# Patient Record
Sex: FEMALE | Race: WHITE | NOT HISPANIC OR LATINO | URBAN - METROPOLITAN AREA
[De-identification: names, ages, dates, MRNs, and addresses within clinical notes are randomized per-mention and may not be internally consistent; named-entity substitution may affect disease eponyms.]

---

## 2017-06-13 ENCOUNTER — HOSPITAL ENCOUNTER (OUTPATIENT)
Dept: MAMMOGRAPHY | Facility: HOSPITAL | Age: 57
Discharge: HOME | End: 2017-06-13
Attending: INTERNAL MEDICINE

## 2017-06-13 DIAGNOSIS — Z12.31 VISIT FOR SCREENING MAMMOGRAM: ICD-10-CM

## 2017-10-10 ENCOUNTER — RECORDS - HEALTHEAST (OUTPATIENT)
Dept: ADMINISTRATIVE | Facility: OTHER | Age: 57
End: 2017-10-10

## 2017-10-10 LAB
LAB AP CHARGES (HE HISTORICAL CONVERSION): NORMAL
PATH REPORT.COMMENTS IMP SPEC: NORMAL
PATH REPORT.COMMENTS IMP SPEC: NORMAL
PATH REPORT.FINAL DX SPEC: NORMAL
PATH REPORT.GROSS SPEC: NORMAL
PATH REPORT.MICROSCOPIC SPEC OTHER STN: NORMAL
PATH REPORT.RELEVANT HX SPEC: NORMAL
RESULT FLAG (HE HISTORICAL CONVERSION): NORMAL

## 2017-11-28 ENCOUNTER — RECORDS - HEALTHEAST (OUTPATIENT)
Dept: ADMINISTRATIVE | Facility: OTHER | Age: 57
End: 2017-11-28

## 2018-06-26 ENCOUNTER — HOSPITAL ENCOUNTER (OUTPATIENT)
Dept: MAMMOGRAPHY | Facility: CLINIC | Age: 58
Discharge: HOME | End: 2018-06-26
Attending: INTERNAL MEDICINE

## 2018-06-26 DIAGNOSIS — Z12.31 VISIT FOR SCREENING MAMMOGRAM: ICD-10-CM

## 2018-12-06 ENCOUNTER — RECORDS - HEALTHEAST (OUTPATIENT)
Dept: ADMINISTRATIVE | Facility: OTHER | Age: 58
End: 2018-12-06

## 2019-08-08 ENCOUNTER — HOSPITAL ENCOUNTER (OUTPATIENT)
Dept: MAMMOGRAPHY | Facility: CLINIC | Age: 59
Discharge: HOME | End: 2019-08-08
Attending: INTERNAL MEDICINE

## 2019-08-08 DIAGNOSIS — Z12.31 VISIT FOR SCREENING MAMMOGRAM: ICD-10-CM

## 2020-08-10 ENCOUNTER — HOSPITAL ENCOUNTER (OUTPATIENT)
Dept: MAMMOGRAPHY | Facility: CLINIC | Age: 60
Discharge: HOME | End: 2020-08-10
Attending: INTERNAL MEDICINE

## 2020-08-10 DIAGNOSIS — Z12.31 VISIT FOR SCREENING MAMMOGRAM: ICD-10-CM

## 2020-11-06 ENCOUNTER — COMMUNICATION - HEALTHEAST (OUTPATIENT)
Dept: SCHEDULING | Facility: CLINIC | Age: 60
End: 2020-11-06

## 2020-11-19 ENCOUNTER — OFFICE VISIT - HEALTHEAST (OUTPATIENT)
Dept: FAMILY MEDICINE | Facility: CLINIC | Age: 60
End: 2020-11-19

## 2020-11-19 DIAGNOSIS — M19.049 ARTHRITIS OF HAND: ICD-10-CM

## 2020-11-19 RX ORDER — PREDNISOLONE ACETATE 10 MG/ML
SUSPENSION/ DROPS OPHTHALMIC
Status: SHIPPED | COMMUNITY
Start: 2020-11-12 | End: 2023-05-22

## 2020-12-10 ENCOUNTER — RECORDS - HEALTHEAST (OUTPATIENT)
Dept: ADMINISTRATIVE | Facility: OTHER | Age: 60
End: 2020-12-10

## 2020-12-18 ENCOUNTER — HOSPITAL ENCOUNTER (OUTPATIENT)
Dept: ULTRASOUND IMAGING | Facility: CLINIC | Age: 60
Discharge: HOME | End: 2020-12-18
Attending: INTERNAL MEDICINE

## 2020-12-18 ENCOUNTER — HOSPITAL ENCOUNTER (OUTPATIENT)
Dept: MAMMOGRAPHY | Facility: CLINIC | Age: 60
Discharge: HOME | End: 2020-12-18
Attending: INTERNAL MEDICINE

## 2020-12-18 DIAGNOSIS — N64.59 INVERSION OF LEFT NIPPLE: ICD-10-CM

## 2020-12-22 ENCOUNTER — RECORDS - HEALTHEAST (OUTPATIENT)
Dept: ADMINISTRATIVE | Facility: OTHER | Age: 60
End: 2020-12-22

## 2021-01-26 ENCOUNTER — HOSPITAL ENCOUNTER (OUTPATIENT)
Dept: MRI IMAGING | Facility: HOSPITAL | Age: 61
Discharge: HOME | End: 2021-01-26
Attending: INTERNAL MEDICINE

## 2021-01-26 DIAGNOSIS — Z85.3 HISTORY OF MALIGNANT NEOPLASM OF BREAST: ICD-10-CM

## 2021-02-16 ENCOUNTER — APPOINTMENT (RX ONLY)
Dept: URBAN - METROPOLITAN AREA CLINIC 322 | Facility: CLINIC | Age: 61
Setting detail: DERMATOLOGY
End: 2021-02-16

## 2021-02-16 VITALS — TEMPERATURE: 96.3 F

## 2021-02-16 DIAGNOSIS — L57.0 ACTINIC KERATOSIS: ICD-10-CM

## 2021-02-16 DIAGNOSIS — L98.8 OTHER SPECIFIED DISORDERS OF THE SKIN AND SUBCUTANEOUS TISSUE: ICD-10-CM

## 2021-02-16 PROCEDURE — ? TREATMENT REGIMEN

## 2021-02-16 PROCEDURE — 99202 OFFICE O/P NEW SF 15 MIN: CPT | Mod: 25

## 2021-02-16 PROCEDURE — ? FULL BODY SKIN EXAM - DECLINED

## 2021-02-16 PROCEDURE — ? COUNSELING

## 2021-02-16 PROCEDURE — ? LIQUID NITROGEN

## 2021-02-16 PROCEDURE — 17000 DESTRUCT PREMALG LESION: CPT

## 2021-02-16 ASSESSMENT — LOCATION DETAILED DESCRIPTION DERM
LOCATION DETAILED: LEFT CENTRAL BUCCAL CHEEK
LOCATION DETAILED: NASAL TIP

## 2021-02-16 ASSESSMENT — LOCATION ZONE DERM
LOCATION ZONE: NOSE
LOCATION ZONE: FACE

## 2021-02-16 ASSESSMENT — LOCATION SIMPLE DESCRIPTION DERM
LOCATION SIMPLE: LEFT CHEEK
LOCATION SIMPLE: NOSE

## 2021-02-16 NOTE — PROCEDURE: MIPS QUALITY
Quality 226: Preventive Care And Screening: Tobacco Use: Screening And Cessation Intervention: Patient screened for tobacco use and is an ex/non-smoker
Quality 130: Documentation Of Current Medications In The Medical Record: Current Medications Documented
Detail Level: Detailed
Quality 431: Preventive Care And Screening: Unhealthy Alcohol Use - Screening: Patient screened for unhealthy alcohol use using a single question and scores less than 2 times per year
Patient baseline mental status

## 2021-05-27 ENCOUNTER — RECORDS - HEALTHEAST (OUTPATIENT)
Dept: ADMINISTRATIVE | Facility: CLINIC | Age: 61
End: 2021-05-27

## 2021-05-29 ENCOUNTER — RECORDS - HEALTHEAST (OUTPATIENT)
Dept: ADMINISTRATIVE | Facility: CLINIC | Age: 61
End: 2021-05-29

## 2021-05-30 ENCOUNTER — RECORDS - HEALTHEAST (OUTPATIENT)
Dept: ADMINISTRATIVE | Facility: CLINIC | Age: 61
End: 2021-05-30

## 2021-06-02 ENCOUNTER — RECORDS - HEALTHEAST (OUTPATIENT)
Dept: ADMINISTRATIVE | Facility: CLINIC | Age: 61
End: 2021-06-02

## 2021-06-05 ENCOUNTER — RECORDS - HEALTHEAST (OUTPATIENT)
Dept: SCHEDULING | Facility: CLINIC | Age: 61
End: 2021-06-05

## 2021-06-05 VITALS
DIASTOLIC BLOOD PRESSURE: 86 MMHG | HEART RATE: 73 BPM | OXYGEN SATURATION: 98 % | SYSTOLIC BLOOD PRESSURE: 114 MMHG | WEIGHT: 147 LBS

## 2021-06-05 DIAGNOSIS — C50.919 MALIGNANT NEOPLASM OF BREAST (FEMALE) (H): ICD-10-CM

## 2021-06-12 NOTE — TELEPHONE ENCOUNTER
New Appointment Needed  What is the reason for the visit:    Pre-Op Appt Request  When is the surgery? :  12/3/20right eye 12/17/20 left eye cataract  Where is the surgery?:   [facility]  Who is the surgeon? :  Dr. Chavez  What type of surgery is being done?: Cataract  Provider Preference: Patient would prefer Dr. Julia Ragsdale/patient would also just like to have a regular full physical with blood work done a few days prior instead of just pre op/please let patient know route of care for that  How soon do you need to be seen?: After 11/18/20  Waitlist offered?: No  Okay to leave a detailed message:  Yes

## 2021-06-13 NOTE — PROGRESS NOTES
[facility]  [address] [place] [address] [address]VE [facility] [address]  [facility] [address]  Dept: [phone]  Dept Fax: [phone]  Primary Provider: Lakshmi Perez MD  Pre-op Performing Provider: MARÍA JEFFERSON    PREOPERATIVE EVALUATION:  Today's date: 11/19/2020    Dipti Reyes is a 60 y.o. female who presents for a preoperative evaluation.    Surgical Information:  Surgery/Procedure: Cataract   Surgery Location: [facility]   Surgeon: Dr. Chavez  Surgery Date: 12/3/20  Time of Surgery:   Where patient plans to recover: At home with family  Fax number for surgical facility: [phone]    Type of Anesthesia Anticipated: to be determined    Subjective     HPI related to upcoming procedure: Patient has a history of laser vision correction and her distance vision continues to degenerate. She is unable to correct this with just glasses at this point. The blurry vision is impacting her life. If the surgery on one eye goes well, she will do the second.       Health Care Directive:  Patient does not have a Health Care Directive or Living Will: Discussed advance care planning with patient; information given to patient to review.    Preoperative Review of :    reviewed - no record of controlled substances prescribed.    See problem list for active medical problems.  Problems all longstanding and stable, except as noted/documented.  See ROS for pertinent symptoms related to these conditions.      Review of Systems   Endorses tinnitus.   Constitutional, neuro, ENT, endocrine, pulmonary, cardiac, gastrointestinal, genitourinary, musculoskeletal, integument and psychiatric systems are negative, except as otherwise noted.      Patient Active Problem List    Diagnosis Date Noted     Arthritis of hand 11/19/2020     Past Medical History:   Diagnosis Date     Breast cancer (H) 2011    left     Cataract      Hx of radiation therapy      Past Surgical History:   Procedure Laterality Date     BREAST BIOPSY  Right 2012     BREAST BIOPSY Left 2011     BREAST LUMPECTOMY Left 2011     OOPHORECTOMY       MD EXCISE BREAST CYST      Description: Breast Surgery Lumpectomy;  Recorded: 03/08/2013;  Comments: Left, breast CA     MD LIGATE FALLOPIAN TUBE      Description: Tubal Ligation;  Recorded: 04/18/2011;     MD REMOVAL OF TONSILS,<13 Y/O      Description: Tonsillectomy;  Recorded: 04/18/2011;     Current Outpatient Medications   Medication Sig Dispense Refill     prednisoLONE acetate (PRED-FORTE) 1 % ophthalmic suspension INSTILL 1 DROP RIGHT EYE 4 TIMES A DAY FOR ONE WEEK THEN TAPER. START AFTER SURGERY.       No current facility-administered medications for this visit.        No Known Allergies    Social History     Tobacco Use     Smoking status: Never Smoker     Smokeless tobacco: Never Used   Substance Use Topics     Alcohol use: Yes     Alcohol/week: 6.0 standard drinks     Types: 6 Glasses of wine per week      Family History   Problem Relation Age of Onset     Hypertension Mother      Macular degeneration Father      Diabetes Sister      Hypertension Paternal Aunt      Social History     Substance and Sexual Activity   Drug Use Never        Objective     /86   Pulse 73   Wt 147 lb (66.7 kg)   SpO2 98%   Physical Exam  Gen: Alert, NAD, appears stated age, normal hygiene   Eyes: conjunctivae without injection, sclera clear, EOMI  ENT/mouth: nares clear, septum midline, absent rhinorrhea, throat without injection, neck is supple, no thyroid enlargement  CV: RRR, no murmur appreciated, pedal edema absent bilaterally  Resp: CTAB, no wheezes, rales or ronchi  ABD: normoactive, non-tender to palpation, nondistended  MSK: grossly full range of motion in all joints, no obvious deformity  Neuro: CN II-XII grossly intact, no deficits in coordination  Psych: no apparent hallucinations or delusions, no pressured speech; alert, oriented x3  SKIN: dry and without lesions  Heme/lymph: no pallor, no active bleeding/bruising,  no adenopathy appreciated    No results for input(s): HGB, PLT, INR, NA, K, CREATININE, HBA1C in the last 88000 hours.     PRE-OP Diagnostics:   No labs were ordered during this visit.  No EKG required for low risk surgery (cataract, skin procedure, breast biopsy, etc).    REVISED CARDIAC RISK INDEX (RCRI)   The patient has the following serious cardiovascular risks for perioperative complications:   - No serious cardiac risks = 0 points    RCRI INTERPRETATION: 0 points: Class I (very low risk - 0.4% complication rate)       Assessment & Plan      The proposed surgical procedure is considered LOW risk.    Medication Instructions:  Patient is on no chronic medications    RECOMMENDATION:  APPROVAL GIVEN to proceed with proposed procedure, without further diagnostic evaluation.    Signed Electronically by: Julia Ragsdale MD    Copy of this evaluation report is provided to requesting physician.    [facility] Guidelines    Revised Cardiac Risk Index

## 2021-06-16 PROBLEM — M19.049 ARTHRITIS OF HAND: Status: ACTIVE | Noted: 2020-11-19

## 2021-07-13 ENCOUNTER — RECORDS - HEALTHEAST (OUTPATIENT)
Dept: ADMINISTRATIVE | Facility: CLINIC | Age: 61
End: 2021-07-13

## 2021-07-21 ENCOUNTER — RECORDS - HEALTHEAST (OUTPATIENT)
Dept: ADMINISTRATIVE | Facility: CLINIC | Age: 61
End: 2021-07-21

## 2022-07-27 ENCOUNTER — LAB REQUISITION (OUTPATIENT)
Dept: LAB | Facility: CLINIC | Age: 62
End: 2022-07-27
Payer: COMMERCIAL

## 2022-07-27 DIAGNOSIS — Z12.4 ENCOUNTER FOR SCREENING FOR MALIGNANT NEOPLASM OF CERVIX: ICD-10-CM

## 2022-07-27 DIAGNOSIS — Z13.220 ENCOUNTER FOR SCREENING FOR LIPOID DISORDERS: ICD-10-CM

## 2022-07-27 DIAGNOSIS — Z13.1 ENCOUNTER FOR SCREENING FOR DIABETES MELLITUS: ICD-10-CM

## 2022-07-27 DIAGNOSIS — Z13.0 ENCOUNTER FOR SCREENING FOR DISEASES OF THE BLOOD AND BLOOD-FORMING ORGANS AND CERTAIN DISORDERS INVOLVING THE IMMUNE MECHANISM: ICD-10-CM

## 2022-07-27 DIAGNOSIS — R31.9 HEMATURIA, UNSPECIFIED: ICD-10-CM

## 2022-07-27 DIAGNOSIS — Z13.21 ENCOUNTER FOR SCREENING FOR NUTRITIONAL DISORDER: ICD-10-CM

## 2022-07-27 LAB
CHOLEST SERPL-MCNC: 251 MG/DL
FASTING STATUS PATIENT QL REPORTED: YES
GLUCOSE SERPL-MCNC: 100 MG/DL (ref 70–99)
HDLC SERPL-MCNC: 76 MG/DL
HGB BLD-MCNC: 15 G/DL (ref 11.7–15.7)
LDLC SERPL CALC-MCNC: 157 MG/DL
NONHDLC SERPL-MCNC: 175 MG/DL
TRIGL SERPL-MCNC: 90 MG/DL

## 2022-07-27 PROCEDURE — 82306 VITAMIN D 25 HYDROXY: CPT | Mod: ORL | Performed by: OBSTETRICS & GYNECOLOGY

## 2022-07-27 PROCEDURE — 82947 ASSAY GLUCOSE BLOOD QUANT: CPT | Mod: ORL | Performed by: OBSTETRICS & GYNECOLOGY

## 2022-07-27 PROCEDURE — 81001 URINALYSIS AUTO W/SCOPE: CPT | Mod: ORL | Performed by: OBSTETRICS & GYNECOLOGY

## 2022-07-27 PROCEDURE — 85018 HEMOGLOBIN: CPT | Mod: ORL | Performed by: OBSTETRICS & GYNECOLOGY

## 2022-07-27 PROCEDURE — G0145 SCR C/V CYTO,THINLAYER,RESCR: HCPCS | Mod: ORL | Performed by: OBSTETRICS & GYNECOLOGY

## 2022-07-27 PROCEDURE — 80061 LIPID PANEL: CPT | Mod: ORL | Performed by: OBSTETRICS & GYNECOLOGY

## 2022-07-28 LAB
ALBUMIN UR-MCNC: NEGATIVE MG/DL
APPEARANCE UR: ABNORMAL
BILIRUB UR QL STRIP: NEGATIVE
COLOR UR AUTO: ABNORMAL
DEPRECATED CALCIDIOL+CALCIFEROL SERPL-MC: 79 UG/L (ref 20–75)
GLUCOSE UR STRIP-MCNC: NEGATIVE MG/DL
HGB UR QL STRIP: NEGATIVE
KETONES UR STRIP-MCNC: NEGATIVE MG/DL
LEUKOCYTE ESTERASE UR QL STRIP: ABNORMAL
NITRATE UR QL: NEGATIVE
PH UR STRIP: 6 [PH] (ref 5–7)
RBC URINE: 1 /HPF
SP GR UR STRIP: 1.02 (ref 1–1.03)
SQUAMOUS EPITHELIAL: 1 /HPF
UROBILINOGEN UR STRIP-MCNC: NORMAL MG/DL
WBC CLUMPS #/AREA URNS HPF: PRESENT /HPF
WBC URINE: 39 /HPF

## 2022-08-01 LAB
BKR LAB AP GYN ADEQUACY: NORMAL
BKR LAB AP GYN INTERPRETATION: NORMAL
BKR LAB AP HPV REFLEX: NORMAL
BKR LAB AP LMP: NORMAL
BKR LAB AP PREVIOUS ABNL DX: NORMAL
BKR LAB AP PREVIOUS ABNORMAL: NORMAL
PATH REPORT.COMMENTS IMP SPEC: NORMAL
PATH REPORT.COMMENTS IMP SPEC: NORMAL
PATH REPORT.RELEVANT HX SPEC: NORMAL

## 2023-05-09 ENCOUNTER — HOSPITAL ENCOUNTER (OUTPATIENT)
Dept: MAMMOGRAPHY | Facility: CLINIC | Age: 63
Discharge: HOME | End: 2023-05-09
Attending: OBSTETRICS & GYNECOLOGY | Admitting: OBSTETRICS & GYNECOLOGY
Payer: COMMERCIAL

## 2023-05-09 DIAGNOSIS — Z12.31 VISIT FOR SCREENING MAMMOGRAM: ICD-10-CM

## 2023-05-09 PROCEDURE — 77067 SCR MAMMO BI INCL CAD: CPT

## 2023-05-14 ENCOUNTER — HEALTH MAINTENANCE LETTER (OUTPATIENT)
Age: 63
End: 2023-05-14

## 2023-05-17 ENCOUNTER — HOSPITAL ENCOUNTER (OUTPATIENT)
Dept: MAMMOGRAPHY | Facility: CLINIC | Age: 63
Discharge: HOME | End: 2023-05-17
Attending: OBSTETRICS & GYNECOLOGY
Payer: COMMERCIAL

## 2023-05-17 DIAGNOSIS — N64.89 BREAST ASYMMETRY: ICD-10-CM

## 2023-05-17 PROCEDURE — 76642 ULTRASOUND BREAST LIMITED: CPT | Mod: LT

## 2023-05-17 PROCEDURE — 77061 BREAST TOMOSYNTHESIS UNI: CPT | Mod: LT

## 2023-05-17 NOTE — PROGRESS NOTES
Radiologist, Dr Akiko Rios, recommends left breast ultrasound guided biopsy.     RN scheduled pt at [facility], [address] , [address], [place], [place]. [phone].  Appt: Mon, 5/22/23, arrival at 0745am for 0800am appt.     RN reviewed the procedure with patient and , and answered their questions. RN gave patient written pre and post biopsy handouts to take home.     Pt verbalizes understanding of procedure and appt location, date, and time. No questions or concerns at this time. Calls welcomed.    Estephanie Anderson, RN, BSN, [facility]  [facility]

## 2023-05-17 NOTE — LETTER
Dipti Osadchuk  [address] [address] [address]            May 17, 2023  Date of Exam: 5/17/23    Dear Dipti:    Thank you for your recent visit.    Breast Imaging Result: Based on your recent breast imaging, you have a suspicious area that usually requires a biopsy, at which time a small tissue sample would be taken from your breast.      Breast Density: Your breast imaging shows that you have dense breast tissue. This means you have a slightly higher risk of getting breast cancer. It also means your breast imaging will be harder to read, but it doesn't mean that breast imaging isn't useful. In fact, yearly breast imaging is even more important for individuals at higher risk. Additional testing may be warranted depending on your overall risk.    If you have already made these arrangements, please disregard this letter.    A report of your breast imaging results was sent to: Ingrid William    Your breast imaging will become part of your medical file here at [facility] for at least 10 years. You are responsible for informing any new health care team or breast imaging facility of the date and location of this examination.    We appreciate the opportunity to participate in your health care.    Sincerely,  Akiko Rios MD   [facility]

## 2023-05-22 ENCOUNTER — HOSPITAL ENCOUNTER (OUTPATIENT)
Dept: MAMMOGRAPHY | Facility: CLINIC | Age: 63
Discharge: HOME | End: 2023-05-22
Attending: OBSTETRICS & GYNECOLOGY
Payer: COMMERCIAL

## 2023-05-22 DIAGNOSIS — N64.89 BREAST ASYMMETRY: ICD-10-CM

## 2023-05-22 PROCEDURE — 250N000009 HC RX 250: Performed by: OBSTETRICS & GYNECOLOGY

## 2023-05-22 PROCEDURE — 88305 TISSUE EXAM BY PATHOLOGIST: CPT | Mod: TC | Performed by: OBSTETRICS & GYNECOLOGY

## 2023-05-22 PROCEDURE — 999N000065 MA POST PROCEDURE LEFT

## 2023-05-22 PROCEDURE — A4648 IMPLANTABLE TISSUE MARKER: HCPCS

## 2023-05-22 RX ADMIN — LIDOCAINE HYDROCHLORIDE 10 ML: 10 INJECTION, SOLUTION EPIDURAL; INFILTRATION; INTRACAUDAL; PERINEURAL at 08:12

## 2023-05-22 NOTE — PROGRESS NOTES
Dipti arrived at [facility] accompanied by . I escorted pt to the Breast Center consult room. Pt was identified using full name and . Wristband was placed on pt wrist. Pt was able to state which procedure and correct side breast biopsy was occurring today. RN reviewed the consent form with the pt and pt was given the consent form to review before signing.     RN reviewed post breast biopsy care. Pt acknowledged understanding of post biopsy care. Pt was given written post breast biopsy care handout to take home.    I explained results are expected in 3-5 business days and Breast Center RN will call pt at number pt provided today.     Pt had no questions or concerns.     I escorted pt to the changing room and pt changed into gown. Pt placed personal belongings in a locker and pt has the hunt. Pt kept her phone with her. I escorted pt to sub-waiting area to have a seat and wait for [employer]. I offered pt the aroma therapy of Cal[employer] oil, and pt declined. I notified [employer], Steven Perera, pt was ready and waiting in sub-waiting area.     Estephanie Anderson, RN, BSN, CBCN  [facility]

## 2023-05-24 ENCOUNTER — TELEPHONE (OUTPATIENT)
Dept: MAMMOGRAPHY | Facility: CLINIC | Age: 63
End: 2023-05-24
Payer: COMMERCIAL

## 2023-05-24 LAB
PATH REPORT.COMMENTS IMP SPEC: NORMAL
PATH REPORT.FINAL DX SPEC: NORMAL
PATH REPORT.GROSS SPEC: NORMAL
PATH REPORT.MICROSCOPIC SPEC OTHER STN: NORMAL
PATH REPORT.RELEVANT HX SPEC: NORMAL
PHOTO IMAGE: NORMAL

## 2023-05-24 PROCEDURE — 88305 TISSUE EXAM BY PATHOLOGIST: CPT | Mod: 26 | Performed by: PATHOLOGY

## 2023-05-24 PROCEDURE — 88342 IMHCHEM/IMCYTCHM 1ST ANTB: CPT | Mod: 26 | Performed by: PATHOLOGY

## 2023-05-24 PROCEDURE — 88341 IMHCHEM/IMCYTCHM EA ADD ANTB: CPT | Mod: 26 | Performed by: PATHOLOGY

## 2023-05-24 NOTE — TELEPHONE ENCOUNTER
I phoned Dipti to inform her of her bx results but her phone went right to . I did leave a generic message TCJEANMARIE.  PIPPA Lord, OCN, CBCN    3:25- I phoned Dipti and was able to reach her. I told her it was good news, the pathology report was reviewed by the radiologist, Dr. Nguyen who confirmed the recent breast imaging is concordant with the final surgical pathology results. I passed along that the radiologists recommends that she return to screening mammograms. She was very relieved and thanked me for the news. She denies any issues with her bx site, sl bruised and a bit tender. No true pain or signs of infection. PIPPA Lord, OCN, CBCN

## 2023-07-21 ENCOUNTER — LAB REQUISITION (OUTPATIENT)
Dept: LAB | Facility: CLINIC | Age: 63
End: 2023-07-21
Payer: COMMERCIAL

## 2023-07-21 DIAGNOSIS — Z13.1 ENCOUNTER FOR SCREENING FOR DIABETES MELLITUS: ICD-10-CM

## 2023-07-21 DIAGNOSIS — E78.5 HYPERLIPIDEMIA, UNSPECIFIED: ICD-10-CM

## 2023-07-21 DIAGNOSIS — E55.9 VITAMIN D DEFICIENCY, UNSPECIFIED: ICD-10-CM

## 2023-07-21 DIAGNOSIS — Z13.0 ENCOUNTER FOR SCREENING FOR DISEASES OF THE BLOOD AND BLOOD-FORMING ORGANS AND CERTAIN DISORDERS INVOLVING THE IMMUNE MECHANISM: ICD-10-CM

## 2023-07-21 LAB
ERYTHROCYTE [DISTWIDTH] IN BLOOD BY AUTOMATED COUNT: 12.7 % (ref 10–15)
HCT VFR BLD AUTO: 44.8 % (ref 35–47)
HGB BLD-MCNC: 14.2 G/DL (ref 11.7–15.7)
MCH RBC QN AUTO: 30.7 PG (ref 26.5–33)
MCHC RBC AUTO-ENTMCNC: 31.7 G/DL (ref 31.5–36.5)
MCV RBC AUTO: 97 FL (ref 78–100)
PLATELET # BLD AUTO: 130 10E3/UL (ref 150–450)
RBC # BLD AUTO: 4.62 10E6/UL (ref 3.8–5.2)
WBC # BLD AUTO: 4.6 10E3/UL (ref 4–11)

## 2023-07-21 PROCEDURE — 82947 ASSAY GLUCOSE BLOOD QUANT: CPT | Mod: ORL | Performed by: OBSTETRICS & GYNECOLOGY

## 2023-07-21 PROCEDURE — 80061 LIPID PANEL: CPT | Mod: ORL | Performed by: OBSTETRICS & GYNECOLOGY

## 2023-07-21 PROCEDURE — 82306 VITAMIN D 25 HYDROXY: CPT | Mod: ORL | Performed by: OBSTETRICS & GYNECOLOGY

## 2023-07-21 PROCEDURE — 85027 COMPLETE CBC AUTOMATED: CPT | Mod: ORL | Performed by: OBSTETRICS & GYNECOLOGY

## 2023-07-22 LAB
CHOLEST SERPL-MCNC: 238 MG/DL
FASTING STATUS PATIENT QL REPORTED: YES
GLUCOSE SERPL-MCNC: 94 MG/DL (ref 70–99)
HDLC SERPL-MCNC: 80 MG/DL
LDLC SERPL CALC-MCNC: 137 MG/DL
NONHDLC SERPL-MCNC: 158 MG/DL
TRIGL SERPL-MCNC: 105 MG/DL

## 2023-07-24 LAB — DEPRECATED CALCIDIOL+CALCIFEROL SERPL-MC: 50 UG/L (ref 20–75)

## 2023-07-31 ENCOUNTER — LAB REQUISITION (OUTPATIENT)
Dept: LAB | Facility: CLINIC | Age: 63
End: 2023-07-31
Payer: COMMERCIAL

## 2023-07-31 DIAGNOSIS — D69.6 THROMBOCYTOPENIA, UNSPECIFIED (H): ICD-10-CM

## 2023-07-31 LAB — PLATELET # BLD AUTO: 142 10E3/UL (ref 150–450)

## 2023-07-31 PROCEDURE — 85049 AUTOMATED PLATELET COUNT: CPT | Mod: ORL | Performed by: OBSTETRICS & GYNECOLOGY

## 2023-08-02 ENCOUNTER — LAB REQUISITION (OUTPATIENT)
Dept: LAB | Facility: CLINIC | Age: 63
End: 2023-08-02
Payer: COMMERCIAL

## 2023-08-02 DIAGNOSIS — Z12.4 ENCOUNTER FOR SCREENING FOR MALIGNANT NEOPLASM OF CERVIX: ICD-10-CM

## 2023-08-02 PROCEDURE — G0145 SCR C/V CYTO,THINLAYER,RESCR: HCPCS | Mod: ORL | Performed by: OBSTETRICS & GYNECOLOGY

## 2024-06-04 ENCOUNTER — HOSPITAL ENCOUNTER (OUTPATIENT)
Dept: MAMMOGRAPHY | Facility: CLINIC | Age: 64
Discharge: HOME | End: 2024-06-04
Attending: OBSTETRICS & GYNECOLOGY | Admitting: OBSTETRICS & GYNECOLOGY
Payer: COMMERCIAL

## 2024-06-04 DIAGNOSIS — Z12.31 VISIT FOR SCREENING MAMMOGRAM: ICD-10-CM

## 2024-06-04 PROCEDURE — 77063 BREAST TOMOSYNTHESIS BI: CPT

## 2024-06-21 ENCOUNTER — OFFICE VISIT (OUTPATIENT)
Dept: FAMILY MEDICINE | Facility: CLINIC | Age: 64
End: 2024-06-21
Payer: COMMERCIAL

## 2024-06-21 VITALS
HEART RATE: 68 BPM | SYSTOLIC BLOOD PRESSURE: 108 MMHG | HEIGHT: 66 IN | WEIGHT: 164 LBS | DIASTOLIC BLOOD PRESSURE: 70 MMHG | TEMPERATURE: 97.8 F | BODY MASS INDEX: 26.36 KG/M2

## 2024-06-21 DIAGNOSIS — H25.9 SENILE CATARACT OF LEFT EYE, UNSPECIFIED AGE-RELATED CATARACT TYPE: ICD-10-CM

## 2024-06-21 DIAGNOSIS — Z01.818 PREOP GENERAL PHYSICAL EXAM: Primary | ICD-10-CM

## 2024-06-21 PROCEDURE — 90715 TDAP VACCINE 7 YRS/> IM: CPT | Performed by: PHYSICIAN ASSISTANT

## 2024-06-21 PROCEDURE — 99204 OFFICE O/P NEW MOD 45 MIN: CPT | Mod: 25 | Performed by: PHYSICIAN ASSISTANT

## 2024-06-21 PROCEDURE — 90471 IMMUNIZATION ADMIN: CPT | Performed by: PHYSICIAN ASSISTANT

## 2024-06-21 NOTE — PROGRESS NOTES
Preoperative Evaluation  [facility]  [address] [address]  Phone: [phone]  Fax: [phone]  Primary Provider: Ingrid William MD  Pre-op Performing Provider: Mariza David PA-C  Jun 21, 2024 6/18/2024   Surgical Information   What procedure is being done? Cataract surgery on left eye   Facility or Hospital where procedure/surgery will be performed: [facility]   Who is doing the procedure / surgery? Dr. Mike Chavez   Date of surgery / procedure: 6-   Time of surgery / procedure: 7:30 a.m.   Where do you plan to recover after surgery? at home with family        Fax number for surgical facility: [phone]    Assessment & Plan     The proposed surgical procedure is considered LOW risk.    Preop general physical exam    Senile cataract of left eye, unspecified age-related cataract type              - No identified additional risk factors other than previously addressed    Preoperative Medication Instructions  Antiplatelet or Anticoagulation Medication Instructions   - Patient is on no antiplatelet or anticoagulation medications.    Additional Medication Instructions  Patient is on no additional chronic medications    Recommendation  Approval given to proceed with proposed procedure, without further diagnostic evaluation.    Shayla Resendez is a 63 year old, presenting for the following:  Pre-Op Exam          6/21/2024    12:15 PM   Additional Questions   Roomed by Emma CASILLAS related to upcoming procedure: left eye cataract, will have surgical repair        6/18/2024   Pre-Op Questionnaire   Have you ever had a heart attack or stroke? No   Have you ever had surgery on your heart or blood vessels, such as a stent placement, a coronary artery bypass, or surgery on an artery in your head, neck, heart, or legs? No   Do you have chest pain with activity? No   Do you have a history of heart failure?  No   Do you currently have a cold, bronchitis or symptoms of other infection? No   Do you have a cough, shortness of breath, or wheezing? No   Do you or anyone in your family have previous history of blood clots? (!) YES mother with PE- possibly due to covid   Do you or does anyone in your family have a serious bleeding problem such as prolonged bleeding following surgeries or cuts? No   Have you ever had problems with anemia or been told to take iron pills? No   Have you had any abnormal blood loss such as black, tarry or bloody stools, or abnormal vaginal bleeding? No   Have you ever had a blood transfusion? No   Are you willing to have a blood transfusion if it is medically needed before, during, or after your surgery? Yes   Have you or any of your relatives ever had problems with anesthesia? No   Do you have sleep apnea, excessive snoring or daytime drowsiness? No   Do you have any artifical heart valves or other implanted medical devices like a pacemaker, defibrillator, or continuous glucose monitor? No   Do you have artificial joints? No   Are you allergic to latex? No        Health Care Directive  Patient does not have a Health Care Directive or Living Will: Patient states has Advance Directive and will bring in a copy to clinic.    Preoperative Review of    reviewed - no record of controlled substances prescribed.          Patient Active Problem List    Diagnosis Date Noted    Arthritis of hand 11/19/2020     Priority: Medium      Past Medical History:   Diagnosis Date    Arthritis     Breast cancer (H) 01/01/2011    left    Cataract     Hx of radiation therapy      Past Surgical History:   Procedure Laterality Date    BIOPSY BREAST Right 01/01/2012    BIOPSY BREAST Left 01/01/2011    COLONOSCOPY      EXCISE BREAST CYST/FIBROADENOMA/TUMOR/DUCT LESION/NIPPLE LESION/AREOLAR LESION      Description: Breast Surgery Lumpectomy;  Recorded: 03/08/2013;  Comments: Left, breast CA    EYE SURGERY      HC REMOVAL  "OF TONSILS,<13 Y/O      Description: Tonsillectomy;  Recorded: 04/18/2011;    LUMPECTOMY BREAST Left 01/01/2011    OOPHORECTOMY      ORTHOPEDIC SURGERY      [facility] LIGATE FALLOPIAN TUBE      Description: Tubal Ligation;  Recorded: 04/18/2011;     No current outpatient medications on file.       No Known Allergies     Social History     Tobacco Use    Smoking status: Never    Smokeless tobacco: Never   Substance Use Topics    Alcohol use: Yes     Alcohol/week: 6.0 standard drinks of alcohol       History   Drug Use Unknown             Review of Systems  CONSTITUTIONAL: NEGATIVE for fever, chills, change in weight  INTEGUMENTARY/SKIN: NEGATIVE for worrisome rashes, moles or lesions  EYES: NEGATIVE for vision changes or irritation  ENT/MOUTH: NEGATIVE for ear, mouth and throat problems  RESP: NEGATIVE for significant cough or SOB  BREAST: NEGATIVE for masses, tenderness or discharge  CV: NEGATIVE for chest pain, palpitations or peripheral edema  GI: NEGATIVE for nausea, abdominal pain, heartburn, or change in bowel habits  : NEGATIVE for frequency, dysuria, or hematuria  MUSCULOSKELETAL: NEGATIVE for significant arthralgias or myalgia  NEURO: NEGATIVE for weakness, dizziness or paresthesias  ENDOCRINE: NEGATIVE for temperature intolerance, skin/hair changes  HEME: NEGATIVE for bleeding problems  PSYCHIATRIC: NEGATIVE for changes in mood or affect    Objective    /70   Pulse 68   Temp 97.8  F (36.6  C)   Ht 1.676 m (5' 6\")   Wt 74.4 kg (164 lb)   LMP  (LMP Unknown)   BMI 26.47 kg/m     Estimated body mass index is 26.47 kg/m  as calculated from the following:    Height as of this encounter: 1.676 m (5' 6\").    Weight as of this encounter: 74.4 kg (164 lb).  Physical Exam  GENERAL: alert and no distress  EYES: Eyes grossly normal to inspection, PERRL and conjunctivae and sclerae normal  HENT: ear canals and TM's normal, nose and mouth without ulcers or lesions  NECK: no adenopathy, no asymmetry, masses, or " scars  RESP: lungs clear to auscultation - no rales, rhonchi or wheezes  CV: regular rate and rhythm, normal S1 S2, no S3 or S4, no murmur, click or rub, no peripheral edema  ABDOMEN: soft, nontender, no hepatosplenomegaly, no masses and bowel sounds normal  MS: no gross musculoskeletal defects noted, no edema  SKIN: no suspicious lesions or rashes  NEURO: Normal strength and tone, mentation intact and speech normal  PSYCH: mentation appears normal, affect normal/bright    Recent Labs   Lab Test 07/31/23  0915 07/21/23  0941   HGB  --  14.2   * 130*        Diagnostics  No labs were ordered during this visit.   No EKG required for low risk surgery (cataract, skin procedure, breast biopsy, etc).    Revised Cardiac Risk Index (RCRI)  The patient has the following serious cardiovascular risks for perioperative complications:   - No serious cardiac risks = 0 points     RCRI Interpretation: 0 points: Class I (very low risk - 0.4% complication rate)         Signed Electronically by: Mariza David PA-C  Copy of this evaluation report is provided to requesting physician.

## 2024-06-21 NOTE — PATIENT INSTRUCTIONS

## 2024-08-06 ENCOUNTER — LAB REQUISITION (OUTPATIENT)
Dept: LAB | Facility: CLINIC | Age: 64
End: 2024-08-06
Payer: COMMERCIAL

## 2024-08-06 DIAGNOSIS — Z13.1 ENCOUNTER FOR SCREENING FOR DIABETES MELLITUS: ICD-10-CM

## 2024-08-06 DIAGNOSIS — Z12.4 ENCOUNTER FOR SCREENING FOR MALIGNANT NEOPLASM OF CERVIX: ICD-10-CM

## 2024-08-06 DIAGNOSIS — Z13.220 ENCOUNTER FOR SCREENING FOR LIPOID DISORDERS: ICD-10-CM

## 2024-08-06 DIAGNOSIS — Z13.228 ENCOUNTER FOR SCREENING FOR OTHER METABOLIC DISORDERS: ICD-10-CM

## 2024-08-06 DIAGNOSIS — Z13.0 ENCOUNTER FOR SCREENING FOR DISEASES OF THE BLOOD AND BLOOD-FORMING ORGANS AND CERTAIN DISORDERS INVOLVING THE IMMUNE MECHANISM: ICD-10-CM

## 2024-08-06 DIAGNOSIS — Z13.9 ENCOUNTER FOR SCREENING, UNSPECIFIED: ICD-10-CM

## 2024-08-06 LAB
ALBUMIN SERPL BCG-MCNC: 4.7 G/DL (ref 3.5–5.2)
ALP SERPL-CCNC: 59 U/L (ref 40–150)
ALT SERPL W P-5'-P-CCNC: 18 U/L (ref 0–50)
ANION GAP SERPL CALCULATED.3IONS-SCNC: 9 MMOL/L (ref 7–15)
AST SERPL W P-5'-P-CCNC: 22 U/L (ref 0–45)
BILIRUB SERPL-MCNC: 0.5 MG/DL
BUN SERPL-MCNC: 17.2 MG/DL (ref 8–23)
CALCIUM SERPL-MCNC: 9.4 MG/DL (ref 8.8–10.4)
CHLORIDE SERPL-SCNC: 105 MMOL/L (ref 98–107)
CHOLEST SERPL-MCNC: 261 MG/DL
CREAT SERPL-MCNC: 0.9 MG/DL (ref 0.51–0.95)
EGFRCR SERPLBLD CKD-EPI 2021: 72 ML/MIN/1.73M2
ERYTHROCYTE [DISTWIDTH] IN BLOOD BY AUTOMATED COUNT: 12.7 % (ref 10–15)
FASTING STATUS PATIENT QL REPORTED: YES
FASTING STATUS PATIENT QL REPORTED: YES
GLUCOSE SERPL-MCNC: 107 MG/DL (ref 70–99)
HBA1C MFR BLD: 5.6 %
HCO3 SERPL-SCNC: 27 MMOL/L (ref 22–29)
HCT VFR BLD AUTO: 45.9 % (ref 35–47)
HDLC SERPL-MCNC: 90 MG/DL
HGB BLD-MCNC: 14.9 G/DL (ref 11.7–15.7)
LDLC SERPL CALC-MCNC: 155 MG/DL
MCH RBC QN AUTO: 30.8 PG (ref 26.5–33)
MCHC RBC AUTO-ENTMCNC: 32.5 G/DL (ref 31.5–36.5)
MCV RBC AUTO: 95 FL (ref 78–100)
NONHDLC SERPL-MCNC: 171 MG/DL
PLATELET # BLD AUTO: 152 10E3/UL (ref 150–450)
POTASSIUM SERPL-SCNC: 4.3 MMOL/L (ref 3.4–5.3)
PROT SERPL-MCNC: 6.8 G/DL (ref 6.4–8.3)
RBC # BLD AUTO: 4.83 10E6/UL (ref 3.8–5.2)
SODIUM SERPL-SCNC: 141 MMOL/L (ref 135–145)
TRIGL SERPL-MCNC: 79 MG/DL
VIT D+METAB SERPL-MCNC: 44 NG/ML (ref 20–50)
WBC # BLD AUTO: 5.4 10E3/UL (ref 4–11)

## 2024-08-06 PROCEDURE — 83036 HEMOGLOBIN GLYCOSYLATED A1C: CPT | Mod: ORL | Performed by: OBSTETRICS & GYNECOLOGY

## 2024-08-06 PROCEDURE — 82306 VITAMIN D 25 HYDROXY: CPT | Mod: ORL | Performed by: OBSTETRICS & GYNECOLOGY

## 2024-08-06 PROCEDURE — 80053 COMPREHEN METABOLIC PANEL: CPT | Mod: ORL | Performed by: OBSTETRICS & GYNECOLOGY

## 2024-08-06 PROCEDURE — 80061 LIPID PANEL: CPT | Mod: ORL | Performed by: OBSTETRICS & GYNECOLOGY

## 2024-08-06 PROCEDURE — 85027 COMPLETE CBC AUTOMATED: CPT | Mod: ORL | Performed by: OBSTETRICS & GYNECOLOGY

## 2024-08-06 PROCEDURE — G0145 SCR C/V CYTO,THINLAYER,RESCR: HCPCS | Mod: ORL | Performed by: OBSTETRICS & GYNECOLOGY

## 2024-09-29 ENCOUNTER — HEALTH MAINTENANCE LETTER (OUTPATIENT)
Age: 64
End: 2024-09-29

## 2025-06-11 ENCOUNTER — HOSPITAL ENCOUNTER (OUTPATIENT)
Dept: MAMMOGRAPHY | Facility: CLINIC | Age: 65
Discharge: HOME | End: 2025-06-11
Attending: OBSTETRICS & GYNECOLOGY
Payer: COMMERCIAL

## 2025-06-11 DIAGNOSIS — Z12.31 VISIT FOR SCREENING MAMMOGRAM: ICD-10-CM

## 2025-06-11 PROCEDURE — 77067 SCR MAMMO BI INCL CAD: CPT
